# Patient Record
Sex: MALE | Race: WHITE | NOT HISPANIC OR LATINO | Employment: STUDENT | URBAN - METROPOLITAN AREA
[De-identification: names, ages, dates, MRNs, and addresses within clinical notes are randomized per-mention and may not be internally consistent; named-entity substitution may affect disease eponyms.]

---

## 2024-05-18 ENCOUNTER — OFFICE VISIT (OUTPATIENT)
Dept: URGENT CARE | Facility: CLINIC | Age: 19
End: 2024-05-18
Payer: COMMERCIAL

## 2024-05-18 VITALS
BODY MASS INDEX: 22.41 KG/M2 | DIASTOLIC BLOOD PRESSURE: 64 MMHG | WEIGHT: 184 LBS | SYSTOLIC BLOOD PRESSURE: 135 MMHG | TEMPERATURE: 100 F | HEIGHT: 76 IN | OXYGEN SATURATION: 96 % | RESPIRATION RATE: 20 BRPM | HEART RATE: 91 BPM

## 2024-05-18 DIAGNOSIS — R50.9 FEVER, UNSPECIFIED FEVER CAUSE: ICD-10-CM

## 2024-05-18 DIAGNOSIS — J02.9 SORE THROAT: ICD-10-CM

## 2024-05-18 DIAGNOSIS — R68.83 CHILLS: ICD-10-CM

## 2024-05-18 DIAGNOSIS — R63.0 DECREASED APPETITE: ICD-10-CM

## 2024-05-18 DIAGNOSIS — J03.90 TONSILLITIS: ICD-10-CM

## 2024-05-18 DIAGNOSIS — J02.0 STREP THROAT: Primary | ICD-10-CM

## 2024-05-18 LAB
CTP QC/QA: YES
CTP QC/QA: YES
MOLECULAR STREP A: POSITIVE
SARS-COV-2 AG RESP QL IA.RAPID: NEGATIVE

## 2024-05-18 PROCEDURE — 96372 THER/PROPH/DIAG INJ SC/IM: CPT | Mod: S$GLB,,, | Performed by: NURSE PRACTITIONER

## 2024-05-18 PROCEDURE — 87811 SARS-COV-2 COVID19 W/OPTIC: CPT | Mod: QW,S$GLB,, | Performed by: NURSE PRACTITIONER

## 2024-05-18 PROCEDURE — 87651 STREP A DNA AMP PROBE: CPT | Mod: QW,S$GLB,, | Performed by: NURSE PRACTITIONER

## 2024-05-18 PROCEDURE — 99204 OFFICE O/P NEW MOD 45 MIN: CPT | Mod: 25,S$GLB,, | Performed by: NURSE PRACTITIONER

## 2024-05-18 RX ORDER — BETAMETHASONE SODIUM PHOSPHATE AND BETAMETHASONE ACETATE 3; 3 MG/ML; MG/ML
6 INJECTION, SUSPENSION INTRA-ARTICULAR; INTRALESIONAL; INTRAMUSCULAR; SOFT TISSUE
Status: COMPLETED | OUTPATIENT
Start: 2024-05-18 | End: 2024-05-18

## 2024-05-18 RX ORDER — PENICILLIN V POTASSIUM 500 MG/1
500 TABLET, FILM COATED ORAL 2 TIMES DAILY
Qty: 20 TABLET | Refills: 0 | Status: SHIPPED | OUTPATIENT
Start: 2024-05-18 | End: 2024-05-28

## 2024-05-18 RX ADMIN — BETAMETHASONE SODIUM PHOSPHATE AND BETAMETHASONE ACETATE 6 MG: 3; 3 INJECTION, SUSPENSION INTRA-ARTICULAR; INTRALESIONAL; INTRAMUSCULAR; SOFT TISSUE at 01:05

## 2024-05-18 NOTE — PROGRESS NOTES
"Subjective:      Patient ID: Sebastian Eugene is a 18 y.o. male.    Vitals:  height is 6' 4" (1.93 m) and weight is 83.4 kg (183 lb 15.6 oz). His tympanic temperature is 99.5 °F (37.5 °C). His blood pressure is 135/64 and his pulse is 91. His respiration is 20 and oxygen saturation is 96%.     Chief Complaint: Fever    18 year old male presents for evaluation of fever, sore throat, headache, and chills. Symptom onset last night. OTC Acetaminophen with some relief. Sick contacts at work last weekend.    Fever   This is a new problem. The current episode started yesterday. The problem occurs constantly. The problem has been gradually worsening. His temperature was unmeasured prior to arrival. Associated symptoms include coughing, headaches and a sore throat. Pertinent negatives include no abdominal pain, chest pain, congestion, diarrhea, ear pain, muscle aches, nausea, rash, sleepiness, urinary pain, vomiting or wheezing. Treatments tried: tylenol, nyquil, ibuprofen. The treatment provided no relief.       Constitution: Positive for appetite change, chills and fever. Negative for sweating and fatigue.   HENT:  Positive for sore throat. Negative for ear pain and congestion.    Neck: neck negative.   Cardiovascular: Negative.  Negative for chest pain.   Eyes: Negative.    Respiratory:  Positive for cough. Negative for wheezing.    Gastrointestinal:  Negative for abdominal pain, nausea, vomiting and diarrhea.   Endocrine: negative.   Genitourinary: Negative.  Negative for dysuria.   Musculoskeletal: Negative.  Negative for muscle ache.   Skin: Negative.  Negative for rash.   Allergic/Immunologic: Negative.  Negative for sneezing.   Neurological:  Positive for headaches.   Hematologic/Lymphatic: Negative.    Psychiatric/Behavioral: Negative.        Objective:     Physical Exam   Constitutional: He is oriented to person, place, and time. He is cooperative.  Non-toxic appearance. No distress. normalawake  HENT:   Head: " Normocephalic and atraumatic.   Ears:   Right Ear: Hearing, tympanic membrane, external ear and ear canal normal. Tympanic membrane is not injected, not scarred, not perforated, not erythematous, not retracted and not bulging. no impacted cerumen  Left Ear: Hearing, tympanic membrane, external ear and ear canal normal. Tympanic membrane is not injected, not scarred, not perforated, not erythematous, not retracted and not bulging. no impacted cerumen  Nose: Nose normal.   Mouth/Throat: Mucous membranes are normal. Mucous membranes are moist. Oropharyngeal exudate and posterior oropharyngeal erythema present. No posterior oropharyngeal edema or cobblestoning. Tonsils are 3+ on the right. Tonsils are 3+ on the left. Tonsillar exudate.   Eyes: Conjunctivae are normal. Pupils are equal, round, and reactive to light. Right eye exhibits no discharge. Left eye exhibits no discharge. No scleral icterus. Extraocular movement intact   Neck: Neck supple.   Cardiovascular: Normal rate.   Pulmonary/Chest: Effort normal.   Abdominal: Normal appearance.   Musculoskeletal: Normal range of motion.         General: Normal range of motion.   Lymphadenopathy:        Head (right side): Tonsillar adenopathy present.        Head (left side): Tonsillar adenopathy present.   Neurological: no focal deficit. He is alert and oriented to person, place, and time.   Skin: Skin is warm, dry and not diaphoretic.   Psychiatric: His behavior is normal. Mood, judgment and thought content normal.   Nursing note and vitals reviewed.    Results for orders placed or performed in visit on 05/18/24   SARS Coronavirus 2 Antigen, POCT Manual Read   Result Value Ref Range    SARS Coronavirus 2 Antigen Negative Negative     Acceptable Yes    POCT Strep A, Molecular   Result Value Ref Range    Molecular Strep A, POC Positive (A) Negative     Acceptable Yes        Assessment:     1. Strep throat    2. Sore throat    3. Tonsillitis     4. Fever, unspecified fever cause    5. Chills    6. Decreased appetite        Plan:       Strep throat  -     penicillin v potassium (VEETID) 500 MG tablet; Take 1 tablet (500 mg total) by mouth 2 (two) times a day. for 10 days  Dispense: 20 tablet; Refill: 0    Sore throat  -     POCT Strep A, Molecular    Tonsillitis  -     betamethasone acetate-betamethasone sodium phosphate injection 6 mg    Fever, unspecified fever cause  -     SARS Coronavirus 2 Antigen, POCT Manual Read    Chills    Decreased appetite        Patient presents for evaluation of sore throat. Decision to perform Strep swab to rule out infection, (+) result discussed. Exam reveals bilateral tonsillitis. Exam negative for tonsillar abscess/otitis media. Decision to administer Betamethasone 6 mg injection for swelling and inflammation. Plan is to treat bacterial infection, manage symptoms, and prevent worsening. Discussed with patient who verbalizes understanding.      Patient Instructions   Rest  Hydration/increase fluids  Warm salt water gargles  Warm tea with lemon and honey  Chloraseptic spray/Cepacol lozenges OTC as directed for sore throat  Alternate Tylenol and Ibuprofen OTC as directed for fever/pain  Complete Penicillin course as directed for Strep Throat  Discard toothbrush on Day 3 of treatment  Typical course and duration of illness discussed  Signs and symptoms of worsening discussed  Follow up as needed/with worsening

## 2024-05-18 NOTE — PATIENT INSTRUCTIONS
Rest  Hydration/increase fluids  Warm salt water gargles  Warm tea with lemon and honey  Chloraseptic spray/Cepacol lozenges OTC as directed for sore throat  Alternate Tylenol and Ibuprofen OTC as directed for fever/pain  Complete Penicillin course as directed for Strep Throat  Discard toothbrush on Day 3 of treatment  Typical course and duration of illness discussed  Signs and symptoms of worsening discussed  Follow up as needed/with worsening